# Patient Record
(demographics unavailable — no encounter records)

---
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
## 2019-03-12 NOTE — ECGEPIP
Stationary ECG Study

                              [facility]

                                       

                                       Test Date:    2019

Pat Name:     TIFFANY GOMEZ               Department:   

Patient ID:   [id number]                 Room:         [place] [address]

Gender:       M                        Technician:   GINNY

:          1998               Requested By: MITCH MC PGY-1

Order Number: [id number]     Reading MD:   Yonis Schaffer

                                 Measurements

Intervals                              Axis          

Rate:         47                       P:            44

MT:           173                      QRS:          55

QRSD:         107                      T:            29

QT:           403                                    

QTc:          358                                    

                           Interpretive Statements

Sinus bradycardia

Normal EKG

No significant change when compared to prior tracing of 2018

 

Electronically Signed On 3- 20:09:56 EDT by Yonis Schaffer
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
## 2019-03-12 NOTE — HPEPDOC
[facility] Medical History & Physical


Date of Admission


Mar 11, 2019





History and Physical


PCP: [facility]


ATTENDING: Dr. Shara Schmidt





HPI: 19 yo M admitted to [facility] for unspecified mood disorder, being medically 

examined today. 


No acute medical complaints today. 


Denies any fevers, chills, weakness, fatigue, HA, CP, SOB, cough, palpitations, 

abdominal pain, N/V/D or changes in bowel or bladder habits.





PMHx: 


Depression


Anxiety


H/O SI 4/18


ADHD





PSHX:


Denies








SOCHX:


Resides in: [facility], from [place]


Marital Status: Single


Kids: None


Employment: Active duty


Tobacco use: Denies


ETOH: Denies


Illicit Drugs: Denies


IV Drug Use: Denies


Tattoos done unprofessionally: Denies 





FAMHX:


Mother: Alive, anxiety, possible dementia


Father: Unknown


Siblings: One Alive, well


Children: None


Unexpected deaths due to medical reasons: None.





ROS:


As noted in HPI, otherwise 11pt ROS of systems reviewed and unremarkable.


                 


PE:      


GEN: 19 yo M, appears stated age. Well-nourished, well developed. No acute 

distress. Alert and oriented x 3. Pleasant, interactive. 


HEENT: Normocephalic, atraumatic. Pupils are equal, round, and reactive to 

light. Extraocular movements are intact. No nystagmus appreciated. Sclera are 

nonicteric. Conjunctiva without injection. Nose midline. Nasal turbinates 

without bogginess. EACs both patent BL. TMs both visualized and gray with good 

cone of light, no bulging or erythema. No facial asymmetry. Moist mucous 

membranes. Dentition fair. Pharynx pink and moist, no cobblestoning. Neck 

supple, trachea midline. No lymphadenopathy or thyromegaly appreciated. 


CHEST: Regular rate and rhythm, +S1, +S2


LUNGS: Clear to auscultation bilaterally. No wheezes, rales, or rhonchi. 

Breathing appears symmetric and easy. Patient is speaking in full sentences. No 

accessory muscle use.


ABD: Round, soft, non-tender, non-distended. +Bowel sounds throughout. No 

rebound or guarding. No costovertebral angle tenderness.


EXT: Pulses 2+ bilaterally dorsalis pedis and radial. No lower extremity edema 

appreciated.


SKIN: Pink, dry, warm. Capillary refill <2sec. No rashes.


NEURO: Alert and oriented x 3. Cranial nerves III-XII are intact. No focal 

deficits appreciated. 





EKG: 


SINUS BRADYCARDIA


Otherwise WNL


Electronically Signed On 5-3-2018 15:20:28 EDT by Francisco Chu





A&P:  19 yo M admitted to [facility] for unspecified mood disorder


1. Psych. Plan per Psychiatry. EKG on file. 


2. Follow up with PCP on discharge.


3. Abnormal TSH. 


Recheck TFTs in AM. 


4. Staff member Reese present throughout exam.





Vital Signs





Vital Signs








  Date Time  Temp Pulse Resp B/P (MAP) Pulse Ox O2 Delivery O2 Flow Rate FiO2


 


3/12/19 06:15 98.8 64 14 116/70 (85)    


 


3/11/19 15:11     98   


 


3/11/19 05:37      Room Air  











Laboratory Data


Labs 24H











Item Value  Date Time


 


White Blood Count 6.4 10^3/uL 3/11/19 0627


 


Red Blood Count 5.17 10^6/uL 3/11/19 0627


 


Hemoglobin 14.6 g/dl 3/11/19 0627


 


Hematocrit 45.0 % 3/11/19 0627


 


Mean Corpuscular Volume 87.0 fl 3/11/19 0627


 


Mean Corpuscular Hemoglobin 28.2 pg 3/11/19 0627


 


Mean Corpuscular Hemoglobin Concent 32.4 g/dl 3/11/19 0627


 


Red Cell Distribution Width 12.3 % 3/11/19 0627


 


Platelet Count 220 10^3/uL 3/11/19 0627


 


Nucleated Red Blood Cells % (auto) 0.0 % 3/11/19 0627


 


Sodium Level 142 MEQ/L 3/11/19 0627


 


Potassium Level 4.3 MEQ/L 3/11/19 0627


 


Chloride Level 106 MEQ/L 3/11/19 0627


 


Carbon Dioxide Level 29 MEQ/L 3/11/19 0627


 


Anion Gap 7 MEQ/L L 3/11/19 0627


 


Blood Urea Nitrogen 21 MG/DL H 3/11/19 0627


 


Creatinine 0.84 MG/DL 3/11/19 0627


 


Fasting Glucose 88 MG/DL 3/11/19 0627


 


Calcium Level 8.6 MG/DL 3/11/19 0627


 


Total Bilirubin 0.4 MG/DL 3/11/19 0627


 


Direct Bilirubin 0.1 MG/DL 3/11/19 0627


 


Aspartate Amino Transf (AST/SGOT) 19 U/L 3/11/19 0627


 


Alanine Aminotransferase (ALT/SGPT) 34 U/L 3/11/19 0627


 


Alkaline Phosphatase 99 U/L 3/11/19 0627


 


Total Protein 7.1 GM/DL 3/11/19 0627


 


Albumin 4.0 GM/DL 3/11/19 0627


 


Albumin/Globulin Ratio 1.29 3/11/19 0627


 


Thyroid Stimulating Hormone (TSH) 6.380 uIU/ML H 3/11/19 0627


 


Salicylates Level < 1.7 MG/DL L 3/11/19 0627


 


Urine Opiates Screen NEGATIVE 3/11/19 0627


 


Urine Methadone Screen NEGATIVE 3/11/19 0627


 


Acetaminophen Level < 2.0 UG/ML L 3/11/19 0627


 


Urine Barbiturates Screen NEGATIVE 3/11/19 0627


 


Urine Phencyclidine Screen NEGATIVE 3/11/19 0627


 


Urine Amphetamines Screen NEGATIVE 3/11/19 0627


 


Urine Benzodiazepines Screen NEGATIVE 3/11/19 0627


 


Urine Cocaine Metabolite Screen NEGATIVE 3/11/19 0627


 


Urine Cannabinoids Screen NEGATIVE 3/11/19 0627


 


Ethyl Alcohol Level < 0.003 % 3/11/19 0627











Home Medications


Scheduled PRN


Ibuprofen (Ibuprofen) 200 Mg Tab, 200 MG PO QID PRN for PAIN





Allergies


Coded Allergies:  


     No Known Drug Allergy (Verified  Allergy, Unknown, 4/30/18)











Raquel Hernandez              Mar 12, 2019 10:22
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
## 2019-03-14 NOTE — MHIPNPDOC
[facility] Progress Note


Progress Note


DATE OF SERVICE: 3/14/19





HISTORY: See HPI.





Interval history: Discussed long-term care with the patient which may mean 

another week here until transferring him. After being made aware of this he 

states he needs help because of occasional suicidal thoughts. Today denies these

thoughts and states his sleep has improved. Reports the seroquel and prazosin 

helped and he does not have any side effects from the medication.  and 

family also agree per patient and staff the he needs long-term care.





He denies SI/HI/AVH/reinier. Continues to be chronically depressed, with improved 

sleep, improved concentration, improved energy. Has been attending groups and 

was seen in social milieu. Remains pleasant and cooperative. States he has 

chronic bradycardia since he used to run marathons and is in good shape. Reports

good appetite.








VITAL SIGNS: See below.





NEW TEST RESULTS: repeat TSH WNL 0.9, EKG: Sinus tamy cardia, QTc 358 ms.





CURRENT MEDICATIONS: See below.





MENTAL STATUS EXAMINATION:


Patient is a 20-year old male, who is in good physical shape, short hair, 

glasses, cooperative/pleasant. Clenched fists when discussing trauma.


Speech: increased latency, decreased amount, normal volume


Eye contact: poor


Language skills are intact


Thought processes including: linear/logical


Thought content: Worries about future directions. Abstract reasoning, and co

mputation: intact Description of associations: intact


Description of abnormal or psychotic thoughts: none, no AVH/reinier/paranoia


Judgment: poor


Insight: fair


Orientation: x3


Recent and remote memory: Intact


Attention span and concentration: poor


Language: english


Fund of knowledge: good


Mood: "okay" 


Affect: mildly dysthymic, anxious, constricted, mood congruent, does not smile





DIAGNOSES:





1. MDD, recurrent with anxious distress


2. Post traumatic Stress disorder





 


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 

depression and bipolar disorder. He meets criteria for MDD with anxious distress

(depressed mood for a "long time", decrease energy, poor sleep, occasional SI 

(not today), poor concentration, anxiety attacks. 





He continues to improve, no nightmares last night. Prazosin 1 mg and seroquel 25

mg QHS helps control sleep/nightmares. Agrees to long-term care, along with 

family, commander at  per patient.





Denies current SI/HI/AVH/brice/paranoia. 





MANAGEMENT PLAN: Patient maintained on a 9.39 involuntary status.  Agrees to 

continue celexa to 20 mg daily, prazosin 1 mg nightly QHS, PRN hydroxyzine. 

Needs further hospital stay for stabilization, safety monitoring, changes in 

medications, to attend therapy groups. 





TIME SPENT: 25 minutes.





Vital Signs





Vital Signs








  Date Time  Temp Pulse Resp B/P (MAP) Pulse Ox O2 Delivery O2 Flow Rate FiO2


 


3/14/19 06:35 97.9 63 14 124/59 (80)    


 


3/11/19 15:11     98   


 


3/11/19 05:37      Room Air  











Current Medications





Current Medications


Acetaminophen (Tylenol Tab) 650 mg Q6HP  PRN PO HEADACHE or DISCOMFORT Last 

administered on 3/12/19at 00:25;  Start 3/11/19 at 14:45


Al Hydrox/Mg Hydrox/Simethicone (Mylanta) 30 ml Q4HP  PRN PO 

HEARTBURN/INDIGESTION;  Start 3/11/19 at 14:45


Aripiprazole (AbiLIFY) 5 mg QHS PO  Last administered on 3/13/19at 22:04;  Start

3/11/19 at 21:00


Citalopram Hydrobromide (CeleXA) 10 mg DAILY PO  Last administered on 3/12/19at 

08:11;  Start 3/11/19 at 09:00;  Stop 3/12/19 at 13:18;  Status DC


Citalopram Hydrobromide (CeleXA) 20 mg DAILY PO  Last administered on 3/14/19at 

08:45;  Start 3/13/19 at 09:00


Home Med (Med Rec Complete!)  ASDIRECTED XX ;  Start 3/11/19 at 11:30;  Stop 

3/11/19 at 11:30;  Status DC


Hydroxyzine HCl (Atarax) 50 mg Q6HP  PRN PO anxiety/aggitation;  Start 3/11/19 

at 14:45


Magnesium Hydroxide (Milk Of Magnesia) 30 ml DAILYPRN  PRN PO CONSTIPATION;  

Start 3/11/19 at 14:45


Nicotine (Nicoderm Cq 21mg) 1 patch DAILY TD  Last administered on 3/12/19at 

08:12;  Start 3/11/19 at 09:00


Prazosin HCl (Minipress) 1 mg QHS PO  Last administered on 3/13/19at 22:04;  

Start 3/13/19 at 21:00


Prazosin HCl (Minipress) 1 mg QHS  PRN PO NIGHTMARES;  Start 3/12/19 at 21:00;  

Stop 3/13/19 at 15:08;  Status DC


Quetiapine Fumarate (SEROquel) 25 mg QHS  PRN PO INSOMNIA Last administered on 

3/13/19at 22:24;  Start 3/13/19 at 15:15


Trazodone HCl (Desyrel) 25 mg QHSP  PRN PO INSOMNIA;  Start 3/13/19 at 14:45;  

Stop 3/13/19 at 15:08;  Status DC


Trazodone HCl (Desyrel) 50 mg QHSP  PRN PO INSOMNIA Last administered on 

3/11/19at 21:28;  Start 3/11/19 at 14:45;  Stop 3/12/19 at 13:18;  Status DC





Allergies


Coded Allergies:  


     No Known Drug Allergy (Verified  Allergy, Unknown, 4/30/18)











M[facility] PGY-1         Mar 14, 2019 09:15

## 2019-03-15 NOTE — MHIPNPDOC
[facility] Progress Note


Progress Note


DATE OF SERVICE: 3/15/19





HISTORY: See HPI.





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 Says 

he wants to serve his time and be done, says she is not a quitter. Says mood 

today is alot better and that last 2 days were worse. Says this was due to 

talking about past in group and with other patients. Says due to his training he

has not been taught to not trust others. Endorses not having nightmares since 

starting. Says he has a fear Says he has suppressed trauma for last 6 years. We 

discussed that he start to journal and put down his thoughts regarding life 

events and when comfortable traumatic events. Discussed placement process in 

order and time needed to arrange transfer.





He denies SI/HI/AVH/reinier. Continues to be chronically depressed (states its on 

another level, feels beat down), medications helping sleep, improved 

concentration, says he can be a little sleepy during the day. Has been attending

groups and was seen in social milieu. Remains pleasant and cooperative. States 

he has chronic bradycardia since he used to run marathons and is in good shape. 

Reports good appetite.








VITAL SIGNS: See below.





NEW TEST RESULTS: repeat TSH WNL 0.9, EKG: Sinus tamy cardia, QTc 358 ms.





CURRENT MEDICATIONS: See below.





MENTAL STATUS EXAMINATION:


Patient is a 20-year old male, who is in good physical shape, short hair, 

glasses, cooperative/pleasant. Clenched fists when discussing trauma.


Speech: increased latency, decreased amount, normal volume


Eye contact: poor


Language skills are intact


Thought processes including: linear/logical


Thought content: Worries about future directions. Abstract reasoning, and 

computation: intact Description of associations: intact


Description of abnormal or psychotic thoughts: none, no AVH/reinier/paranoia


Judgment: poor


Insight: fair


Orientation: x3


Recent and remote memory: Intact


Attention span and concentration: poor


Language: english


Fund of knowledge: good


Mood: "6/10, alright", less labile compared to past 2 days.


Affect: mildly dysthymic, anxious, constricted, mood congruent, does not smile





DIAGNOSES:





1. MDD, recurrent with anxious distress


2. Post traumatic Stress disorder





 


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 

depression and bipolar disorder. He meets criteria for MDD with anxious distress

(depressed mood for a "long time", decrease energy, poor sleep, occasional SI 

(not today), poor concentration, anxiety attacks. 





He continues to improve, no nightmares again last night. Prazosin 1 mg helps 

control nightmares, says has been somewhat tired during the day so he can try 

not taking the PRN seroquel 25 mg tonight and see if energy improves. Continues 

to be set on long-term care, along with family, commander at  per 

patient.





Denies current SI/HI/AVH/brice/paranoia. But endorses fear he could when 

"stressed" harm himself.





MANAGEMENT PLAN: Patient maintained on a 9.39 involuntary status.  Agrees to 

continue celexa to 20 mg daily, prazosin 1 mg nightly QHS, PRN hydroxyzine, PRN 

seroquel 25 mg. Needs further hospital stay for stabilization, safety 

monitoring, changes in medications, to attend therapy groups. 





TIME SPENT: 20 minutes.





Vital Signs





Vital Signs








  Date Time  Temp Pulse Resp B/P (MAP) Pulse Ox O2 Delivery O2 Flow Rate FiO2


 


3/15/19 06:21 97.8 70 16 122/57 (78)    


 


3/11/19 15:11     98   


 


3/11/19 05:37      Room Air  











Current Medications





Current Medications


Acetaminophen (Tylenol Tab) 650 mg Q6HP  PRN PO HEADACHE or DISCOMFORT Last 

administered on 3/12/19at 00:25;  Start 3/11/19 at 14:45


Al Hydrox/Mg Hydrox/Simethicone (Mylanta) 30 ml Q4HP  PRN PO 

HEARTBURN/INDIGESTION;  Start 3/11/19 at 14:45


Aripiprazole (AbiLIFY) 5 mg QHS PO  Last administered on 3/14/19at 21:41;  Start

3/11/19 at 21:00


Citalopram Hydrobromide (CeleXA) 10 mg DAILY PO  Last administered on 3/12/19at 

08:11;  Start 3/11/19 at 09:00;  Stop 3/12/19 at 13:18;  Status DC


Citalopram Hydrobromide (CeleXA) 20 mg DAILY PO  Last administered on 3/15/19at 

08:04;  Start 3/13/19 at 09:00;  Stop 3/15/19 at 10:50;  Status DC


Citalopram Hydrobromide (CeleXA) 20 mg QHS PO ;  Start 3/16/19 at 21:00


Home Med (Med Rec Complete!)  ASDIRECTED XX ;  Start 3/11/19 at 11:30;  Stop 

3/11/19 at 11:30;  Status DC


Hydroxyzine HCl (Atarax) 50 mg Q6HP  PRN PO anxiety/aggitation;  Start 3/11/19 

at 14:45


Magnesium Hydroxide (Milk Of Magnesia) 30 ml DAILYPRN  PRN PO CONSTIPATION;  

Start 3/11/19 at 14:45


Nicotine (Nicoderm Cq 21mg) 1 patch DAILY TD  Last administered on 3/15/19at 

09:29;  Start 3/11/19 at 09:00


Prazosin HCl (Minipress) 1 mg QHS PO  Last administered on 3/14/19at 21:42;  

Start 3/13/19 at 21:00


Prazosin HCl (Minipress) 1 mg QHS  PRN PO NIGHTMARES;  Start 3/12/19 at 21:00;  

Stop 3/13/19 at 15:08;  Status DC


Quetiapine Fumarate (SEROquel) 25 mg QHS  PRN PO INSOMNIA Last administered on 3

/13/19at 22:24;  Start 3/13/19 at 15:15


Trazodone HCl (Desyrel) 25 mg QHSP  PRN PO INSOMNIA;  Start 3/13/19 at 14:45;  

Stop 3/13/19 at 15:08;  Status DC


Trazodone HCl (Desyrel) 50 mg QHSP  PRN PO INSOMNIA Last administered on 

3/11/19at 21:28;  Start 3/11/19 at 14:45;  Stop 3/12/19 at 13:18;  Status DC





Allergies


Coded Allergies:  


     No Known Drug Allergy (Verified  Allergy, Unknown, 4/30/18)











[facility] PGY-1         Mar 15, 2019 15:47

## 2019-03-18 NOTE — MHIPNPDOC
[facility] Progress Note


Progress Note


DATE OF SERVICE: 3/18/19





HISTORY: See HPI.





Interval history: States that he did a little journalling. Says he wrote a list 

of "to do's". Says he signed power of  over to her. Says it has helped 

relieved some stress. Says sleep is a little worse than usual. Says he feels 

sluggish during the day. Says he wants prazosin as needed. Says he has been 

having strange dreams  since coming to the hospital and basic theme seems to be 

"abandonment". Says when dad left when young, mother became depressed and she 

could not care for him and he had stay with with his grandma. Continues to stay 

in contact in mother, but that she can be drunk; at theses times it is a 

problem, because she screams so he has to hang up the phone. Says he has not 

been in contact with her while here in the hospital. Says he has only been talk

ing to/seeing wife daily. Denies nightmares, took prazosin last night. Denies 

side effects from celexa. Continues to go to groups. Appetite is good.





He continues to deny passive and active SI/HI/AVH/reinier. States last SI a few 

weeks ago. Continues to be chronically depressed (states its on another level, 

feels alone and done with the world), medications continues helping sleep, im

proved concentration, says he can be a little sleepy during the day. Has been 

attending groups and was seen in social milieu. Remains pleasant and 

cooperative. States he has chronic bradycardia since he used to run marathons 

and is in good shape. Reports good appetite.








VITAL SIGNS: See below.





NEW TEST RESULTS: repeat TSH WNL 0.9, EKG: Sinus tamy cardia, QTc 358 ms.





CURRENT MEDICATIONS: See below.





MENTAL STATUS EXAMINATION:


Patient is a 20-year old male, who is in good physical shape, short hair, glass

es, cooperative/pleasant. Clenched fists when discussing trauma.


Speech: increased latency, decreased amount, normal volume


Eye contact: poor


Language skills are intact


Thought processes including: linear/logical


Thought content: Worries about future directions. Abstract reasoning, and 

computation: intact Description of associations: intact


Description of abnormal or psychotic thoughts: none, no AVH/reinier/paranoia


Judgment: poor


Insight: fair


Orientation: x3


Recent and remote memory: Intact


Attention span and concentration: poor


Language: english


Fund of knowledge: good


Mood: "pretty decent", less labile compared to past 2 days.


Affect: mildly dysthymic, anxious, constricted, mood congruent, does not smile





DIAGNOSES:





1. MDD, recurrent with anxious distress


2. Post traumatic Stress disorder





 


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 

depression and bipolar disorder. He meets criteria for MDD with anxious distress

(depressed mood for a "long time", decrease energy, poor sleep, occasional SI 

(not today), poor concentration, anxiety attacks. 





He continues to improve, again no nightmares last night. Prazosin 1 mg helps 

control nightmares, says has been somewhat tired during the day so can make prn,

 agrees d/c PRN seroquel 25 mg. Continues to agree to long-term care, along with

family, commander at  per patient.





Denies current SI/HI/AVH/brice/paranoia. 





MANAGEMENT PLAN: Patient maintained on a 9.39 involuntary status.  Agrees to 

continue celexa to 20 mg daily, prazosin 1 mg nightly QHS PRN, PRN hydroxyzine, 

d/c PRN seroquel 25 mg. Needs further hospital stay for stabilization, safety 

monitoring, changes in medications, to attend therapy groups. 





TIME SPENT: 15 minutes.





Vital Signs





Vital Signs








  Date Time  Temp Pulse Resp B/P (MAP) Pulse Ox O2 Delivery O2 Flow Rate FiO2


 


3/18/19 06:28 97.1 58 16 127/59 (81)    











Current Medications





Current Medications


Acetaminophen (Tylenol Tab) 650 mg Q6HP  PRN PO HEADACHE or DISCOMFORT Last 

administered on 3/12/19at 00:25;  Start 3/11/19 at 14:45


Al Hydrox/Mg Hydrox/Simethicone (Mylanta) 30 ml Q4HP  PRN PO 

HEARTBURN/INDIGESTION;  Start 3/11/19 at 14:45


Aripiprazole (AbiLIFY) 5 mg QHS PO  Last administered on 3/17/19at 20:04;  Start

3/11/19 at 21:00


Citalopram Hydrobromide (CeleXA) 10 mg DAILY PO  Last administered on 3/12/19at 

08:11;  Start 3/11/19 at 09:00;  Stop 3/12/19 at 13:18;  Status DC


Citalopram Hydrobromide (CeleXA) 20 mg DAILY PO  Last administered on 3/15/19at 

08:04;  Start 3/13/19 at 09:00;  Stop 3/15/19 at 10:50;  Status DC


Citalopram Hydrobromide (CeleXA) 20 mg QHS PO  Last administered on 3/17/19at 

20:05;  Start 3/16/19 at 21:00


Home Med (Med Rec Complete!)  ASDIRECTED XX ;  Start 3/11/19 at 11:30;  Stop 

3/11/19 at 11:30;  Status DC


Hydroxyzine HCl (Atarax) 50 mg Q6HP  PRN PO anxiety/aggitation;  Start 3/11/19 

at 14:45


Magnesium Hydroxide (Milk Of Magnesia) 30 ml DAILYPRN  PRN PO CONSTIPATION;  

Start 3/11/19 at 14:45


Nicotine (Nicoderm Cq 21mg) 1 patch DAILY TD  Last administered on 3/16/19at 

08:55;  Start 3/11/19 at 09:00


Prazosin HCl (Minipress) 1 mg QHS PO  Last administered on 3/17/19at 20:05;  

Start 3/13/19 at 21:00


Prazosin HCl (Minipress) 1 mg QHS  PRN PO NIGHTMARES;  Start 3/12/19 at 21:00;  

Stop 3/13/19 at 15:08;  Status DC


Quetiapine Fumarate (SEROquel) 25 mg QHS  PRN PO INSOMNIA Last administered on 

3/13/19at 22:24;  Start 3/13/19 at 15:15


Trazodone HCl (Desyrel) 25 mg QHSP  PRN PO INSOMNIA;  Start 3/13/19 at 14:45;  

Stop 3/13/19 at 15:08;  Status DC


Trazodone HCl (Desyrel) 50 mg QHSP  PRN PO INSOMNIA Last administered on 

3/11/19at 21:28;  Start 3/11/19 at 14:45;  Stop 3/12/19 at 13:18;  Status DC





Allergies


Coded Allergies:  


     No Known Drug Allergy (Verified  Allergy, Unknown, 4/30/18)











[facility] PGY-1         Mar 18, 2019 11:26

## 2019-03-19 NOTE — MHIPNPDOC
[facility] Progress Note


Progress Note


DATE OF SERVICE: 3/19/19





HISTORY: See HPI.





Interval history: Patient states he is doing "fine". Reports that he enjoys 

groups. He was asking about transfer and told him still pending, date 

undetermined. Says when not using prazosin sleep is "a little worse". So decided

to keep prazosin as PRN medication. Denies AVH/reinier/SI/HI.








VITAL SIGNS: See below.





NEW TEST RESULTS: none





CURRENT MEDICATIONS: See below.





MENTAL STATUS EXAMINATION:


Patient is a 20-year old male, who is in good physical shape, short hair, 

glasses, cooperative/pleasant. Clenched fists when discussing trauma.


Speech: increased latency, decreased amount, normal volume


Eye contact: poor


Language skills are intact


Thought processes including: linear/logical


Thought content: Worries about future directions. Abstract reasoning, and 

computation: intact Description of associations: intact


Description of abnormal or psychotic thoughts: none, no AVH/reinier/paranoia


Judgment: poor


Insight: fair


Orientation: x3


Recent and remote memory: Intact


Attention span and concentration: poor


Language: english


Fund of knowledge: good


Mood: "fine"


Affect: mildly dysthymic, anxious, constricted, mood congruent, does not smile





DIAGNOSES:





1. MDD, recurrent with anxious distress


2. Post traumatic Stress disorder





 


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 

depression and bipolar disorder. He meets criteria for MDD with anxious distress

(depressed mood for a "long time", decrease energy, poor sleep, occasional SI 

(not today), poor concentration, anxiety attacks. 





PTSD symptoms present, nightmares controlled with prazosin, but make him feel 

tired so will continue him on the medication PRN. Tolerating celexa without side

effects. Pending placement in long-term care. Date for transfer still 

undetermined.





Denies current SI/HI/AVH/brice/paranoia. 





MANAGEMENT PLAN: Patient maintained on a 9.39 involuntary status.  Agrees to 

continue celexa to 20 mg daily, prazosin 1 mg nightly QHS PRN, PRN hydroxyzine. 

Needs further hospital stay for stabilization, safety monitoring, changes in 

medications, to attend therapy groups, pending transfer. 





TIME SPENT: 15 minutes.





Vital Signs





Vital Signs








  Date Time  Temp Pulse Resp B/P (MAP) Pulse Ox O2 Delivery O2 Flow Rate FiO2


 


3/19/19 07:01 98.7 51 14 126/59 (81)    











Current Medications





Current Medications


Acetaminophen (Tylenol Tab) 650 mg Q6HP  PRN PO HEADACHE or DISCOMFORT Last 

administered on 3/12/19at 00:25;  Start 3/11/19 at 14:45


Al Hydrox/Mg Hydrox/Simethicone (Mylanta) 30 ml Q4HP  PRN PO 

HEARTBURN/INDIGESTION;  Start 3/11/19 at 14:45


Aripiprazole (AbiLIFY) 5 mg QHS PO  Last administered on 3/18/19at 20:33;  Start

3/11/19 at 21:00


Citalopram Hydrobromide (CeleXA) 10 mg DAILY PO  Last administered on 3/12/19at 

08:11;  Start 3/11/19 at 09:00;  Stop 3/12/19 at 13:18;  Status DC


Citalopram Hydrobromide (CeleXA) 20 mg DAILY PO  Last administered on 3/15/19at 

08:04;  Start 3/13/19 at 09:00;  Stop 3/15/19 at 10:50;  Status DC


Citalopram Hydrobromide (CeleXA) 20 mg QHS PO  Last administered on 3/18/19at 

20:33;  Start 3/16/19 at 21:00


Home Med (Med Rec Complete!)  ASDIRECTED XX ;  Start 3/11/19 at 11:30;  Stop 

3/11/19 at 11:30;  Status DC


Hydroxyzine HCl (Atarax) 50 mg Q6HP  PRN PO anxiety/aggitation;  Start 3/11/19 

at 14:45


Magnesium Hydroxide (Milk Of Magnesia) 30 ml DAILYPRN  PRN PO CONSTIPATION;  

Start 3/11/19 at 14:45


Nicotine (Nicoderm Cq 21mg) 1 patch DAILY TD  Last administered on 3/16/19at 

08:55;  Start 3/11/19 at 09:00


Prazosin HCl (Minipress) 1 mg QHS PO  Last administered on 3/17/19at 20:05;  

Start 3/13/19 at 21:00;  Stop 3/18/19 at 11:28;  Status DC


Prazosin HCl (Minipress) 1 mg QHS  PRN PO NIGHTMARES;  Start 3/12/19 at 21:00;  

Stop 3/13/19 at 15:08;  Status DC


Prazosin HCl (Minipress) 1 mg QHSP  PRN PO NIGHTMARES;  Start 3/18/19 at 21:00; 

Stop 4/17/19 at 20:59


Quetiapine Fumarate (SEROquel) 25 mg QHS  PRN PO INSOMNIA Last administered on 

3/13/19at 22:24;  Start 3/13/19 at 15:15;  Stop 3/18/19 at 11:07;  Status DC


Trazodone HCl (Desyrel) 25 mg QHSP  PRN PO INSOMNIA;  Start 3/13/19 at 14:45;  

Stop 3/13/19 at 15:08;  Status DC


Trazodone HCl (Desyrel) 50 mg QHSP  PRN PO INSOMNIA Last administered on 

3/11/19at 21:28;  Start 3/11/19 at 14:45;  Stop 3/12/19 at 13:18;  Status DC





Allergies


Coded Allergies:  


     No Known Drug Allergy (Verified  Allergy, Unknown, 4/30/18)











[facility] PGY-1         Mar 19, 2019 10:48
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
